# Patient Record
Sex: MALE | Race: WHITE | Employment: OTHER | URBAN - METROPOLITAN AREA
[De-identification: names, ages, dates, MRNs, and addresses within clinical notes are randomized per-mention and may not be internally consistent; named-entity substitution may affect disease eponyms.]

---

## 2020-01-18 ENCOUNTER — HOSPITAL ENCOUNTER (EMERGENCY)
Age: 40
Discharge: HOME | End: 2020-01-18
Attending: EMERGENCY MEDICINE
Payer: COMMERCIAL

## 2020-01-18 VITALS
DIASTOLIC BLOOD PRESSURE: 83 MMHG | RESPIRATION RATE: 18 BRPM | SYSTOLIC BLOOD PRESSURE: 153 MMHG | TEMPERATURE: 97.8 F | HEART RATE: 70 BPM | OXYGEN SATURATION: 100 %

## 2020-01-18 PROCEDURE — 6370000000 HC RX 637 (ALT 250 FOR IP): Performed by: EMERGENCY MEDICINE

## 2020-01-18 PROCEDURE — 99282 EMERGENCY DEPT VISIT SF MDM: CPT

## 2020-01-18 RX ORDER — PENICILLIN V POTASSIUM 500 MG/1
500 TABLET ORAL ONCE
Status: COMPLETED | OUTPATIENT
Start: 2020-01-18 | End: 2020-01-18

## 2020-01-18 RX ORDER — PENICILLIN V POTASSIUM 500 MG/1
500 TABLET ORAL 4 TIMES DAILY
Qty: 40 TABLET | Refills: 0 | Status: SHIPPED | OUTPATIENT
Start: 2020-01-18 | End: 2020-01-28

## 2020-01-18 RX ADMIN — PENICILLIN V POTASIUM 500 MG: 500 TABLET OROPHARYNGEAL at 08:44

## 2020-01-18 ASSESSMENT — PAIN SCALES - GENERAL
PAINLEVEL_OUTOF10: 7
PAINLEVEL_OUTOF10: 7

## 2020-01-18 ASSESSMENT — ENCOUNTER SYMPTOMS
NAUSEA: 0
COUGH: 0
EYE DISCHARGE: 0
BACK PAIN: 0
SORE THROAT: 0
SHORTNESS OF BREATH: 0
EYE PAIN: 0
RHINORRHEA: 0
ABDOMINAL PAIN: 0
VOMITING: 0

## 2020-01-18 ASSESSMENT — PAIN DESCRIPTION - ORIENTATION: ORIENTATION: LEFT;LOWER

## 2020-01-18 ASSESSMENT — PAIN DESCRIPTION - LOCATION
LOCATION: TEETH
LOCATION: TEETH

## 2020-01-18 ASSESSMENT — PAIN DESCRIPTION - PAIN TYPE
TYPE: ACUTE PAIN
TYPE: ACUTE PAIN

## 2020-01-18 NOTE — ED PROVIDER NOTES
[address] ENCOUNTER      Pt Name: Morales Aceves  MRN: [id number]  [place] 1980  Date of evaluation: 1/18/2020  Provider: Sudheer Woodall MD    CHIEF COMPLAINT       Chief Complaint   Patient presents with    Dental Pain     left lower dental pain         HISTORY OF PRESENT ILLNESS      Morales Aceves is a 35 y.o. male who presents to the emergency department  for   Chief Complaint   Patient presents with    Dental Pain     left lower dental pain       35 yom presents with left lower tooth pain. He is concerned about tooth infection. He has a history of poor dentition. He reports that he fracture a left lower molar recently. He tried going to a dentist today but they did not have openings. He was told to he be seen by the dentist in 2 days after the weekend is over. Denies any bleeding or drainage from amount. He is controlling his pain at home with ibuprofen. Denies any trouble swallowing. No trouble chewing. Denies any chest pain abdominal pain. No significant facial swelling. No constitutional infectious symptoms. No nausea or vomiting. Nursing Notes, Triage Notes & Vital Signs were reviewed. REVIEW OF SYSTEMS    (2-9 systems for level 4, 10 or more for level 5)     Review of Systems   Constitutional: Negative for chills and fever. HENT: Positive for dental problem. Negative for congestion, rhinorrhea and sore throat. Eyes: Negative for pain and discharge. Respiratory: Negative for cough and shortness of breath. Cardiovascular: Negative for chest pain and palpitations. Gastrointestinal: Negative for abdominal pain, nausea and vomiting. Endocrine: Negative for polydipsia and polyuria. Genitourinary: Negative for dysuria and flank pain. Musculoskeletal: Negative for back pain and neck pain. Skin: Negative for pallor and wound.    Neurological: Negative for dizziness, facial asymmetry, light-headedness, numbness and headaches. Psychiatric/Behavioral: Negative for confusion. Except as noted above the remainder of the review of systems was reviewed and negative. PAST MEDICAL HISTORY     Past Medical History:   Diagnosis Date    MRSA (methicillin resistant staph aureus) culture positive        Prior to Admission medications    Medication Sig Start Date End Date Taking? Authorizing Provider   penicillin v potassium (VEETID) 500 MG tablet Take 1 tablet by mouth 4 times daily for 10 days 1/18/20 1/28/20 Yes Michael Jo MD   HYDROcodone-acetaminophen [facility]) 5-325 MG per tablet Take 1 tablet by mouth every 4 hours as needed for Pain 10/5/15   Dedrick Fry MD        There is no problem list on file for this patient. SURGICAL HISTORY     History reviewed. No pertinent surgical history. CURRENT MEDICATIONS       Previous Medications    HYDROCODONE-ACETAMINOPHEN (NORCO) 5-325 MG PER TABLET    Take 1 tablet by mouth every 4 hours as needed for Pain       ALLERGIES     Codeine    FAMILY HISTORY     History reviewed. No pertinent family history.        SOCIAL HISTORY       Social History     Socioeconomic History    Marital status:      Spouse name: None    Number of children: None    Years of education: None    Highest education level: None   Occupational History    None   Social Needs    Financial resource strain: None    Food insecurity:     Worry: None     Inability: None    Transportation needs:     Medical: None     Non-medical: None   Tobacco Use    Smoking status: Current Every Day Smoker     Packs/day: 1.00    Smokeless tobacco: Never Used   Substance and Sexual Activity    Alcohol use: No    Drug use: No    Sexual activity: None   Lifestyle    Physical activity:     Days per week: None     Minutes per session: None    Stress: None   Relationships    Social connections:     Talks on phone: None     Gets together: None     Attends Nondenominational service: None     Active member of club or organization: None     Attends meetings of clubs or organizations: None     Relationship status: None    Intimate partner violence:     Fear of current or ex partner: None     Emotionally abused: None     Physically abused: None     Forced sexual activity: None   Other Topics Concern    None   Social History Narrative    None       SCREENINGS    Dalia Coma Scale  Eye Opening: Spontaneous  Best Verbal Response: Oriented  Best Motor Response: Obeys commands  Dalia Coma Scale Score: 15          PHYSICAL EXAM    (up to 7 for level 4, 8 or more for level 5)     ED Triage Vitals [01/18/20 0801]   BP Temp Temp Source Pulse Resp SpO2 Height Weight   (!) 153/83 97.8 °F (36.6 °C) Oral 70 18 100 % -- --       Physical Exam  Vitals signs reviewed. Constitutional:       Appearance: He is not ill-appearing or toxic-appearing. HENT:      Head: Normocephalic and atraumatic. Nose: No congestion or rhinorrhea. Mouth/Throat:      Mouth: Mucous membranes are moist.      Pharynx: No oropharyngeal exudate or posterior oropharyngeal erythema. Comments: Poor dentition, fractured tooth, left lower molar. Some mild gingival swelling. No fluctuance or drainable abscess noted  No floor of mouth induration, tongue elevation, uvula midline  Eyes:      General:         Right eye: No discharge. Left eye: No discharge. Extraocular Movements: Extraocular movements intact. Pupils: Pupils are equal, round, and reactive to light. Neck:      Musculoskeletal: Normal range of motion. No neck rigidity. Cardiovascular:      Rate and Rhythm: Normal rate. Heart sounds: No friction rub. No gallop. Pulmonary:      Effort: Pulmonary effort is normal.      Breath sounds: No rales. Chest:      Chest wall: No tenderness. Abdominal:      Palpations: Abdomen is soft. Tenderness: There is no tenderness. There is no guarding.       Comments: Abdomen soft, non-tender, non-peritoneal  No guarding on abdominal exam   Musculoskeletal: Normal range of motion. General: No tenderness. Right lower leg: No edema. Left lower leg: No edema. Lymphadenopathy:      Cervical: No cervical adenopathy. Skin:     General: Skin is warm. Capillary Refill: Capillary refill takes less than 2 seconds. Findings: No erythema, lesion or rash. Neurological:      General: No focal deficit present. Mental Status: He is alert and oriented to person, place, and time. DIAGNOSTIC RESULTS     Labs Reviewed - No data to display         RADIOLOGY:     Non-plain film images such as CT, Ultrasound and MRI are read by the radiologist. Plain radiographic images are visualized and preliminarily interpreted by the emergency physician. Interpretation per the Radiologist below, if available at the time of this note:    No orders to display         ED BEDSIDE ULTRASOUND:   Performed by ED Physician Dane Clark MD       LABS:  Labs Reviewed - No data to display    All other labs were within normal range or not returned as of this dictation. EMERGENCY DEPARTMENT COURSE and DIFFERENTIAL DIAGNOSIS/MDM:   Vitals:    Vitals:    01/18/20 0801   BP: (!) 153/83   Pulse: 70   Resp: 18   Temp: 97.8 °F (36.6 °C)   TempSrc: Oral   SpO2: 100%           MDM  Number of Diagnoses or Management Options  Dental infection:   Pain, dental:   Diagnosis management comments: 35 yom presents with left lower dental pain. He has previously fractured a left lower molar. He endorses pain at the site. He went to a dentist today but is unable to get an appointment till 2 days from now. He denies any fever or chills. No trouble swallowing or chewing. On exam, he does have some gingival swelling in the left lower mouth. No fluctuance or drainable abscess noted. He will be given a dose of penicillin VK in the emergency department.   He will prescribed antibiotics for home.  He has been controlling his pain at home with Advil. He will follow-up with his dentist in 2 days. He is also given a list of dental resources. Return precautions for worsening concerning symptoms discussed. He discharged home in stable condition. CONSULTS:  None    PROCEDURES:  None performed unless otherwise noted below     Procedures        FINAL IMPRESSION      1. Pain, dental    2. Dental infection          DISPOSITION/PLAN   DISPOSITION Discharge - Pending Orders Complete 01/18/2020 08:44:51 AM      PATIENT REFERRED TO:  No follow-up provider specified. DISCHARGE MEDICATIONS:  New Prescriptions    PENICILLIN V POTASSIUM (VEETID) 500 MG TABLET    Take 1 tablet by mouth 4 times daily for 10 days       ED Provider Disposition Time  DISPOSITION Discharge - Pending Orders Complete 01/18/2020 08:44:51 AM      The Patient was instructed to read the package inserts with any medication that was prescribed. Major potential reactions and medication interactions were discussed. The Patient understands that there are numerous possible adverse reactions not covered. The patient was also instructed to arrange follow-up with his or her primary care provider for review of any pending labwork or incidental findings on any radiology results that were obtained. All efforts were made to discuss any incidental findings that require further monitoring. Controlled Substances Monitoring:     No flowsheet data found.     (Please note that portions of this note were completed with a voice recognition program.  Efforts were made to edit the dictations but occasionally words are mis-transcribed.)    Arbutus Essex, MD (electronically signed)  Attending Emergency Physician          Arbutus Essex, MD  01/18/20 1266

## 2022-09-25 ENCOUNTER — APPOINTMENT (OUTPATIENT)
Dept: CT IMAGING | Age: 42
DRG: 465 | End: 2022-09-25
Payer: COMMERCIAL

## 2022-09-25 ENCOUNTER — HOSPITAL ENCOUNTER (INPATIENT)
Age: 42
LOS: 1 days | Discharge: LEFT BEFORE DISCHARGE | DRG: 465 | End: 2022-09-25
Attending: EMERGENCY MEDICINE | Admitting: STUDENT IN AN ORGANIZED HEALTH CARE EDUCATION/TRAINING PROGRAM
Payer: COMMERCIAL

## 2022-09-25 VITALS
TEMPERATURE: 97.9 F | BODY MASS INDEX: 31.08 KG/M2 | HEIGHT: 75 IN | OXYGEN SATURATION: 97 % | RESPIRATION RATE: 16 BRPM | SYSTOLIC BLOOD PRESSURE: 180 MMHG | HEART RATE: 60 BPM | DIASTOLIC BLOOD PRESSURE: 102 MMHG | WEIGHT: 250 LBS

## 2022-09-25 DIAGNOSIS — D72.829 LEUKOCYTOSIS, UNSPECIFIED TYPE: ICD-10-CM

## 2022-09-25 DIAGNOSIS — N20.0 KIDNEY STONE: Primary | ICD-10-CM

## 2022-09-25 DIAGNOSIS — R73.9 HYPERGLYCEMIA: ICD-10-CM

## 2022-09-25 DIAGNOSIS — N10 ACUTE PYELONEPHRITIS: ICD-10-CM

## 2022-09-25 LAB
ALBUMIN SERPL-MCNC: 4.1 GM/DL (ref 3.4–5)
ALP BLD-CCNC: 62 IU/L (ref 40–129)
ALT SERPL-CCNC: 17 U/L (ref 10–40)
ANION GAP SERPL CALCULATED.3IONS-SCNC: 13 MMOL/L (ref 4–16)
AST SERPL-CCNC: 16 IU/L (ref 15–37)
BACTERIA: NORMAL /HPF
BASOPHILS ABSOLUTE: 0.1 K/CU MM
BASOPHILS RELATIVE PERCENT: 0.4 % (ref 0–1)
BILIRUB SERPL-MCNC: 0.2 MG/DL (ref 0–1)
BILIRUBIN URINE: NEGATIVE
BLOOD, URINE: NORMAL
BUN BLDV-MCNC: 16 MG/DL (ref 6–23)
CALCIUM SERPL-MCNC: 9.2 MG/DL (ref 8.3–10.6)
CHLORIDE BLD-SCNC: 104 MMOL/L (ref 99–110)
CLARITY: NORMAL
CO2: 19 MMOL/L (ref 21–32)
COLOR: YELLOW
CREAT SERPL-MCNC: 0.9 MG/DL (ref 0.9–1.3)
DIFFERENTIAL TYPE: ABNORMAL
EOSINOPHILS ABSOLUTE: 0.2 K/CU MM
EOSINOPHILS RELATIVE PERCENT: 0.9 % (ref 0–3)
GFR AFRICAN AMERICAN: >60 ML/MIN/1.73M2
GFR NON-AFRICAN AMERICAN: >60 ML/MIN/1.73M2
GLUCOSE BLD-MCNC: 183 MG/DL (ref 70–99)
GLUCOSE, URINE: NEGATIVE MG/DL
HCT VFR BLD CALC: 43.8 % (ref 42–52)
HEMOGLOBIN: 14.4 GM/DL (ref 13.5–18)
IMMATURE NEUTROPHIL %: 0.5 % (ref 0–0.43)
KETONES, URINE: NORMAL MG/DL
LACTATE: 1.6 MMOL/L (ref 0.4–2)
LEUKOCYTE ESTERASE, URINE: NORMAL
LYMPHOCYTES ABSOLUTE: 1.9 K/CU MM
LYMPHOCYTES RELATIVE PERCENT: 9.4 % (ref 24–44)
MCH RBC QN AUTO: 30.8 PG (ref 27–31)
MCHC RBC AUTO-ENTMCNC: 32.9 % (ref 32–36)
MCV RBC AUTO: 93.8 FL (ref 78–100)
MONOCYTES ABSOLUTE: 0.8 K/CU MM
MONOCYTES RELATIVE PERCENT: 4.2 % (ref 0–4)
MUCUS: NORMAL HPF
NITRITE URINE, QUANTITATIVE: NEGATIVE
NUCLEATED RBC %: 0 %
PDW BLD-RTO: 13.2 % (ref 11.7–14.9)
PH, URINE: 5.5
PLATELET # BLD: 254 K/CU MM (ref 140–440)
PMV BLD AUTO: 12.1 FL (ref 7.5–11.1)
POTASSIUM SERPL-SCNC: 4.4 MMOL/L (ref 3.5–5.1)
PROTEIN UA: 30 MG/DL
RBC # BLD: 4.67 M/CU MM (ref 4.6–6.2)
RBC URINE: 37 /HPF
SEGMENTED NEUTROPHILS ABSOLUTE COUNT: 16.7 K/CU MM
SEGMENTED NEUTROPHILS RELATIVE PERCENT: 84.6 % (ref 36–66)
SODIUM BLD-SCNC: 136 MMOL/L (ref 135–145)
SPECIFIC GRAVITY UA: >1.03
TOTAL IMMATURE NEUTOROPHIL: 0.1 K/CU MM
TOTAL NUCLEATED RBC: 0 K/CU MM
TOTAL PROTEIN: 7.7 GM/DL (ref 6.4–8.2)
TRICHOMONAS: NORMAL /HPF
UROBILINOGEN, URINE: 0.2 MG/DL
WBC # BLD: 19.7 K/CU MM (ref 4–10.5)
WBC UA: 17 /HPF

## 2022-09-25 PROCEDURE — 87150 DNA/RNA AMPLIFIED PROBE: CPT

## 2022-09-25 PROCEDURE — 85025 COMPLETE CBC W/AUTO DIFF WBC: CPT

## 2022-09-25 PROCEDURE — 96365 THER/PROPH/DIAG IV INF INIT: CPT

## 2022-09-25 PROCEDURE — 87040 BLOOD CULTURE FOR BACTERIA: CPT

## 2022-09-25 PROCEDURE — 81001 URINALYSIS AUTO W/SCOPE: CPT

## 2022-09-25 PROCEDURE — 2580000003 HC RX 258: Performed by: EMERGENCY MEDICINE

## 2022-09-25 PROCEDURE — 80053 COMPREHEN METABOLIC PANEL: CPT

## 2022-09-25 PROCEDURE — 6360000002 HC RX W HCPCS: Performed by: EMERGENCY MEDICINE

## 2022-09-25 PROCEDURE — 96375 TX/PRO/DX INJ NEW DRUG ADDON: CPT

## 2022-09-25 PROCEDURE — 83605 ASSAY OF LACTIC ACID: CPT

## 2022-09-25 PROCEDURE — 99285 EMERGENCY DEPT VISIT HI MDM: CPT

## 2022-09-25 PROCEDURE — 2580000003 HC RX 258: Performed by: NURSE PRACTITIONER

## 2022-09-25 PROCEDURE — 96376 TX/PRO/DX INJ SAME DRUG ADON: CPT

## 2022-09-25 PROCEDURE — 6360000002 HC RX W HCPCS: Performed by: NURSE PRACTITIONER

## 2022-09-25 PROCEDURE — 74176 CT ABD & PELVIS W/O CONTRAST: CPT

## 2022-09-25 PROCEDURE — 87086 URINE CULTURE/COLONY COUNT: CPT

## 2022-09-25 PROCEDURE — 1200000000 HC SEMI PRIVATE

## 2022-09-25 RX ORDER — 0.9 % SODIUM CHLORIDE 0.9 %
1000 INTRAVENOUS SOLUTION INTRAVENOUS ONCE
Status: COMPLETED | OUTPATIENT
Start: 2022-09-25 | End: 2022-09-25

## 2022-09-25 RX ORDER — KETOROLAC TROMETHAMINE 10 MG/1
10 TABLET, FILM COATED ORAL EVERY 6 HOURS PRN
Qty: 20 TABLET | Refills: 0 | Status: SHIPPED | OUTPATIENT
Start: 2022-09-25 | End: 2022-09-25 | Stop reason: CLARIF

## 2022-09-25 RX ORDER — KETOROLAC TROMETHAMINE 30 MG/ML
15 INJECTION, SOLUTION INTRAMUSCULAR; INTRAVENOUS EVERY 6 HOURS
Status: DISCONTINUED | OUTPATIENT
Start: 2022-09-25 | End: 2022-09-25 | Stop reason: HOSPADM

## 2022-09-25 RX ORDER — ONDANSETRON 4 MG/1
4 TABLET, ORALLY DISINTEGRATING ORAL ONCE
Status: DISCONTINUED | OUTPATIENT
Start: 2022-09-25 | End: 2022-09-25 | Stop reason: HOSPADM

## 2022-09-25 RX ORDER — FENTANYL CITRATE 50 UG/ML
50 INJECTION, SOLUTION INTRAMUSCULAR; INTRAVENOUS ONCE
Status: COMPLETED | OUTPATIENT
Start: 2022-09-25 | End: 2022-09-25

## 2022-09-25 RX ORDER — HYDROCODONE BITARTRATE AND ACETAMINOPHEN 5; 325 MG/1; MG/1
1-2 TABLET ORAL EVERY 4 HOURS PRN
Qty: 15 TABLET | Refills: 0 | Status: SHIPPED | OUTPATIENT
Start: 2022-09-25 | End: 2022-09-25 | Stop reason: CLARIF

## 2022-09-25 RX ORDER — NICOTINE 21 MG/24HR
1 PATCH, TRANSDERMAL 24 HOURS TRANSDERMAL DAILY
Status: DISCONTINUED | OUTPATIENT
Start: 2022-09-25 | End: 2022-09-25 | Stop reason: HOSPADM

## 2022-09-25 RX ORDER — SODIUM CHLORIDE 9 MG/ML
INJECTION, SOLUTION INTRAVENOUS CONTINUOUS
Status: DISCONTINUED | OUTPATIENT
Start: 2022-09-25 | End: 2022-09-25

## 2022-09-25 RX ORDER — TAMSULOSIN HYDROCHLORIDE 0.4 MG/1
0.4 CAPSULE ORAL DAILY
Status: DISCONTINUED | OUTPATIENT
Start: 2022-09-25 | End: 2022-09-25 | Stop reason: HOSPADM

## 2022-09-25 RX ORDER — ONDANSETRON 2 MG/ML
4 INJECTION INTRAMUSCULAR; INTRAVENOUS EVERY 6 HOURS PRN
Status: DISCONTINUED | OUTPATIENT
Start: 2022-09-25 | End: 2022-09-25 | Stop reason: HOSPADM

## 2022-09-25 RX ORDER — SODIUM CHLORIDE 9 MG/ML
INJECTION, SOLUTION INTRAVENOUS CONTINUOUS
Status: DISCONTINUED | OUTPATIENT
Start: 2022-09-25 | End: 2022-09-25 | Stop reason: HOSPADM

## 2022-09-25 RX ORDER — POLYETHYLENE GLYCOL 3350 17 G/17G
17 POWDER, FOR SOLUTION ORAL DAILY PRN
Status: DISCONTINUED | OUTPATIENT
Start: 2022-09-25 | End: 2022-09-25 | Stop reason: HOSPADM

## 2022-09-25 RX ORDER — ONDANSETRON 2 MG/ML
4 INJECTION INTRAMUSCULAR; INTRAVENOUS EVERY 30 MIN PRN
Status: DISCONTINUED | OUTPATIENT
Start: 2022-09-25 | End: 2022-09-25 | Stop reason: HOSPADM

## 2022-09-25 RX ORDER — ONDANSETRON 4 MG/1
4 TABLET, FILM COATED ORAL 3 TIMES DAILY PRN
Qty: 15 TABLET | Refills: 0 | Status: SHIPPED | OUTPATIENT
Start: 2022-09-25 | End: 2022-09-25 | Stop reason: CLARIF

## 2022-09-25 RX ORDER — SODIUM CHLORIDE 0.9 % (FLUSH) 0.9 %
5-40 SYRINGE (ML) INJECTION PRN
Status: DISCONTINUED | OUTPATIENT
Start: 2022-09-25 | End: 2022-09-25 | Stop reason: HOSPADM

## 2022-09-25 RX ORDER — MORPHINE SULFATE 4 MG/ML
4 INJECTION, SOLUTION INTRAMUSCULAR; INTRAVENOUS ONCE
Status: COMPLETED | OUTPATIENT
Start: 2022-09-25 | End: 2022-09-25

## 2022-09-25 RX ORDER — SODIUM CHLORIDE 0.9 % (FLUSH) 0.9 %
5-40 SYRINGE (ML) INJECTION EVERY 12 HOURS SCHEDULED
Status: DISCONTINUED | OUTPATIENT
Start: 2022-09-25 | End: 2022-09-25 | Stop reason: HOSPADM

## 2022-09-25 RX ORDER — ACETAMINOPHEN 650 MG/1
650 SUPPOSITORY RECTAL EVERY 6 HOURS PRN
Status: DISCONTINUED | OUTPATIENT
Start: 2022-09-25 | End: 2022-09-25 | Stop reason: HOSPADM

## 2022-09-25 RX ORDER — ONDANSETRON 4 MG/1
4 TABLET, ORALLY DISINTEGRATING ORAL EVERY 8 HOURS PRN
Status: DISCONTINUED | OUTPATIENT
Start: 2022-09-25 | End: 2022-09-25 | Stop reason: HOSPADM

## 2022-09-25 RX ORDER — KETOROLAC TROMETHAMINE 30 MG/ML
15 INJECTION, SOLUTION INTRAMUSCULAR; INTRAVENOUS ONCE
Status: COMPLETED | OUTPATIENT
Start: 2022-09-25 | End: 2022-09-25

## 2022-09-25 RX ORDER — ACETAMINOPHEN 325 MG/1
650 TABLET ORAL EVERY 6 HOURS PRN
Status: DISCONTINUED | OUTPATIENT
Start: 2022-09-25 | End: 2022-09-25 | Stop reason: HOSPADM

## 2022-09-25 RX ORDER — SODIUM CHLORIDE 9 MG/ML
25 INJECTION, SOLUTION INTRAVENOUS PRN
Status: DISCONTINUED | OUTPATIENT
Start: 2022-09-25 | End: 2022-09-25 | Stop reason: HOSPADM

## 2022-09-25 RX ADMIN — SODIUM CHLORIDE: 9 INJECTION, SOLUTION INTRAVENOUS at 12:03

## 2022-09-25 RX ADMIN — ONDANSETRON 4 MG: 2 INJECTION INTRAMUSCULAR; INTRAVENOUS at 08:34

## 2022-09-25 RX ADMIN — MORPHINE SULFATE 4 MG: 4 INJECTION, SOLUTION INTRAMUSCULAR; INTRAVENOUS at 09:57

## 2022-09-25 RX ADMIN — ONDANSETRON 4 MG: 2 INJECTION INTRAMUSCULAR; INTRAVENOUS at 12:19

## 2022-09-25 RX ADMIN — MORPHINE SULFATE 4 MG: 4 INJECTION, SOLUTION INTRAMUSCULAR; INTRAVENOUS at 12:19

## 2022-09-25 RX ADMIN — FENTANYL CITRATE 50 MCG: 50 INJECTION, SOLUTION INTRAMUSCULAR; INTRAVENOUS at 08:35

## 2022-09-25 RX ADMIN — CEFTRIAXONE SODIUM 1000 MG: 1 INJECTION, POWDER, FOR SOLUTION INTRAMUSCULAR; INTRAVENOUS at 12:04

## 2022-09-25 RX ADMIN — SODIUM CHLORIDE 1000 ML: 9 INJECTION, SOLUTION INTRAVENOUS at 08:38

## 2022-09-25 RX ADMIN — KETOROLAC TROMETHAMINE 15 MG: 30 INJECTION, SOLUTION INTRAMUSCULAR; INTRAVENOUS at 07:01

## 2022-09-25 ASSESSMENT — ENCOUNTER SYMPTOMS
SHORTNESS OF BREATH: 0
ABDOMINAL PAIN: 1
VOMITING: 1
NAUSEA: 1
COUGH: 0

## 2022-09-25 ASSESSMENT — PAIN DESCRIPTION - LOCATION
LOCATION: ABDOMEN;FLANK
LOCATION: ABDOMEN;FLANK
LOCATION: ABDOMEN;BACK
LOCATION: ABDOMEN
LOCATION: FLANK
LOCATION: ABDOMEN

## 2022-09-25 ASSESSMENT — PAIN DESCRIPTION - ORIENTATION
ORIENTATION: LEFT
ORIENTATION: LEFT;LOWER
ORIENTATION: LEFT
ORIENTATION: LEFT;LOWER
ORIENTATION: LOWER;LEFT

## 2022-09-25 ASSESSMENT — PAIN DESCRIPTION - DESCRIPTORS
DESCRIPTORS: SHARP;STABBING
DESCRIPTORS: STABBING
DESCRIPTORS: JABBING;HEAVINESS

## 2022-09-25 ASSESSMENT — PAIN - FUNCTIONAL ASSESSMENT
PAIN_FUNCTIONAL_ASSESSMENT: 0-10

## 2022-09-25 ASSESSMENT — PAIN SCALES - GENERAL
PAINLEVEL_OUTOF10: 10
PAINLEVEL_OUTOF10: 7
PAINLEVEL_OUTOF10: 7
PAINLEVEL_OUTOF10: 10
PAINLEVEL_OUTOF10: 10
PAINLEVEL_OUTOF10: 7

## 2022-09-25 NOTE — ED PROVIDER NOTES
I independently examined and evaluated Ruben Gambino. In brief, 69-year-old male who presents with left lower quadrant abdominal pain, abrupt onset last evening. Describes associated vomiting and cold chills. No history of similar symptoms or prior abdominal surgeries. Focused exam revealed the patient appears well-hydrated, well-nourished. Appears uncomfortable. Mucous membranes are moist. Speech is clear. Breathing is unlabored. Skin is dry. Mental status is normal. The patient moves all extremities and is without facial droop. ED course: Patient is quite uncomfortable on arrival.  His pain has been somewhat difficult to control. He is given Toradol as well as additional opiates. Nausea controlled with Zofran. He has been given IV fluids. Labs are reviewed and are notable for leukocytosis of 19. Renal function is unremarkable the glucose is elevated at 183. Urinalysis is concerning for high specific gravity, small leuks and occasional bacteria. Imaging obtained as patient's presentation is highly suspicious for obstructive uropathy. There is a possible phlebolith versus obstructing stone per radiologist.    I am concerned for pyelonephritis versus infected ureteral stone. Patient has been given Rocephin. Both blood and urine cultures pending. Care discussed with urology. Agree with plan for admission. Final Impression:  1. Kidney stone    2. Acute pyelonephritis    3. Hyperglycemia    4. Leukocytosis, unspecified type      DISPOSITION Decision To Admit 09/25/2022 11:33:02 AM      All diagnostic, treatment, and disposition decisions were made by myself in conjunction with the advanced practice provider. For all further details of the patient's emergency department visit, please see the advanced practice provider's documentation.     Comment: Please note this report has been produced using speech recognition software and may contain errors related to that system including errors in grammar, punctuation, and spelling, as well as words and phrases that may be inappropriate. If there are any questions or concerns please feel free to contact the dictating provider for clarification.         [address] [place] [address],   09/25/22 0458

## 2022-09-25 NOTE — ED PROVIDER NOTES
[address] ENCOUNTER        Pt Name: Kamala Carbajal  MRN: [id number]  [place] 1980  Date of evaluation: 9/25/2022  Provider: NIKOLAS Meade CNP  PCP: No primary care provider on file. I have seen and evaluated this patient with my supervising physician Dr. Ady So       Chief Complaint   Patient presents with    Abdominal Pain     Left lower    Flank Pain         HISTORY OF PRESENT ILLNESS      Chief Complaint: Left lower quadrant abdominal pain    Francis Gilbert is a 36 y.o. male who presents for evaluation of left lower quadrant abdominal pain that started suddenly approximately midnight tonight. Patient reports the pain is severe and has been associated with several episodes of vomiting. Patient continues to be nauseous but has not vomited for the last couple of hours. She denies any diarrhea, fever, chills, or urinary symptoms. Patient has no prior history of similar symptoms. No prior abdominal surgeries. Nursing Notes were all reviewed and agreed with or any disagreements were addressed in the HPI. REVIEW OF SYSTEMS     Constitutional:   Denies fever, chills, weight loss or weakness   Cardiovascular:   Denies chest pain, palpitations   Respiratory:  Denies cough or shortness of breath    GI: Endorses abdominal pain, nausea, vomiting. Denies  diarrhea  :  Denies any urinary symptoms   Musculoskeletal:   Denies back pain  Skin:   Denies rash  Endocrine:  Denies polyuria or polydypsia   Lymphatic:  Denies swollen glands     PAST MEDICAL HISTORY     Past Medical History:   Diagnosis Date    MRSA (methicillin resistant staph aureus) culture positive        SURGICAL HISTORY   History reviewed. No pertinent surgical history. CURRENTMEDICATIONS       There are no discharge medications for this patient.       ALLERGIES     Codeine    FAMILYHISTORY     History reviewed. No pertinent family history. SOCIAL HISTORY       Social History     Socioeconomic History    Marital status:      Spouse name: None    Number of children: None    Years of education: None    Highest education level: None   Tobacco Use    Smoking status: Every Day     Packs/day: 1.00     Types: Cigarettes    Smokeless tobacco: Never   Vaping Use    Vaping Use: Never used   Substance and Sexual Activity    Alcohol use: No    Drug use: Yes     Types: Marijuana [place])     Comment: daily    Sexual activity: Yes     Partners: Female       SCREENINGS    Dalia Coma Scale  Eye Opening: Spontaneous  Best Verbal Response: Oriented  Best Motor Response: Obeys commands  [place] Coma Scale Score: 15      PHYSICAL EXAM       ED Triage Vitals   BP Temp Temp Source Heart Rate Resp SpO2 Height Weight   09/25/22 0606 09/25/22 0606 09/25/22 0606 09/25/22 0606 09/25/22 0606 09/25/22 0606 09/25/22 0602 09/25/22 0602   (!) 176/98 97.9 °F (36.6 °C) Oral 51 19 98 % 6' 3\" (1.905 m) 250 lb (113.4 kg)      Constitutional:  Well developed, Well nourished. No distress  HENT:  Normocephalic, Atraumatic. Cardiovascular:   RRR,  no murmurs/rubs/gallops. Respiratory:   Nonlabored breathing. Normal breath sounds, No wheezing  Abdomen: Bowel sounds normal, Soft, no masses, moderate tenderness over the left lower quadrant. No CVA tenderness bilaterally. Musculoskeletal:  No tenderness over the lower back  Integument:   Warm, Dry, no rash  Neurologic:  Alert & oriented , Sensation intact.   Psychiatric:  Affect normal, Mood normal.     DIAGNOSTIC RESULTS   LABS:    Labs Reviewed   COMPREHENSIVE METABOLIC PANEL - Abnormal; Notable for the following components:       Result Value    CO2 19 (*)     Glucose 183 (*)     All other components within normal limits   CBC WITH AUTO DIFFERENTIAL - Abnormal; Notable for the following components:    WBC 19.7 (*)     MPV 12.1 (*)     Segs Relative 84.6 (*)     Lymphocytes % 9.4 (*) Monocytes % 4.2 (*)     Immature Neutrophil % 0.5 (*)     All other components within normal limits   CULTURE, URINE   CULTURE, BLOOD 1   CULTURE, BLOOD 2   URINALYSIS   MICROSCOPIC URINALYSIS   LACTIC ACID       When ordered, only abnormal lab results are displayed. All other labs were within normal range or not returned as of this dictation. EKG: When ordered, EKG's are interpreted by the Emergency Department Physician in the absence of a cardiologist.  Please see their note for interpretation of EKG. RADIOLOGY:   Non-plain film images such as CT, Ultrasound and MRI are read by the radiologist. Plain radiographic images are visualized and preliminarily interpreted by the  ED Provider with the below findings:    Interpretation perthe Radiologist below, if available at the time of this note:    CT ABDOMEN PELVIS WO CONTRAST Additional Contrast? None   Final Result   Small 3 mm calcification favored to represent a small phlebolith abutting the   left ureter rather than a proximal ureteral calculus. Small calculus within the midpole of left kidney which is nonobstructive. No results found.       PROCEDURES   Unless otherwise noted below, none         CRITICAL CARE   CRITICAL CARE NOTE:  N/A    CONSULTS:  IP CONSULT TO HOSPITALIST  IP CONSULT TO UROLOGY      EMERGENCY DEPARTMENT COURSE and MDM:   Vitals:    Vitals:    09/25/22 0602 09/25/22 0606 09/25/22 0957 09/25/22 1136   BP:  (!) 176/98  (!) 153/74   Pulse:  51  70   Resp:  19 15 18   Temp:  97.9 °F (36.6 °C)     TempSrc:  Oral     SpO2:  98%  96%   Weight: 250 lb (113.4 kg)      Height: 6' 3\" (1.905 m)          Patient was given thefollowing medications:  Medications   ondansetron (ZOFRAN-ODT) disintegrating tablet 4 mg ( Oral Canceled Entry 9/25/22 0702)   ondansetron (ZOFRAN) injection 4 mg (4 mg IntraVENous Given 9/25/22 1219)   cefTRIAXone (ROCEPHIN) 1,000 mg in dextrose 5 % 50 mL IVPB mini-bag (1,000 mg IntraVENous New Bag 9/25/22 1204) 0.9 % sodium chloride infusion ( IntraVENous New Bag 9/25/22 1203)   ketorolac (TORADOL) injection 15 mg (15 mg IntraVENous Given 9/25/22 0701)   0.9 % sodium chloride bolus (0 mLs IntraVENous Stopped 9/25/22 1012)   fentaNYL (SUBLIMAZE) injection 50 mcg (50 mcg IntraVENous Given 9/25/22 0835)   morphine sulfate (PF) injection 4 mg (4 mg IntraVENous Given 9/25/22 0957)   morphine sulfate (PF) injection 4 mg (4 mg IntraVENous Given 9/25/22 1219)     Is this patient to be included in the SEP-1 Core Measure due to severe sepsis or septic shock? No, infection not suspected      MDM:  Patient presents as above. Emergent etiologies considered. Patient seen and examined. Work-up initiated secondary to presentation, physical exam findings, vital signs and medical chart review. In brief, patient is a 48-year-old male with no significant medical history who presents for evaluation of left lower quadrant abdominal pain that started suddenly approximately 6 hours ago. On exam, patient is quite uncomfortable and is agitated. He has tender over the left lower quadrant and has no CVA tenderness. Differential initially includes primarily diverticulitis versus kidney stone. Patient refused an IV. He was given the option of IM Toradol secondarily and he refused this as well. Thus he was just given Zofran. We will check labs and a urinalysis and do a CT of the abdomen and pelvis to evaluate for abdominal pathology. 7215: On reevaluation, the patient is feeling much better. Pain is well controlled. CT reveals a small 3 mm nonobstructive proximal ureteral calculus vs. Phlebolith. CMP unremarkable except for glucose of 183. Awaiting CBC, urine. 1728: CBC shows WBC of 19.2. Patient has no urinary symptoms or systemic symptoms of illness. Awaiting urinalysis. Patient is having difficulty providing sample. Agreeable to IV fluids at this point. Also complaining of increased pain.   Will administer small amount of fentanyl. 8464:  Re-evaluated patient. He reports uncontrolled pain, no improvement with fentanyl. States he is unable to provide urine sample until pain controlled. Will order morphine 4mg. Updated him on CBC results and need for urine to rule out possibility of urinary infection complicating kidney stone. 1145: Patient's urinalysis does appear concerning for infection. Differential now includes complicated UTI versus pyelonephritis versus infected kidney stone. Given these findings plus his leukocytosis as well as hyperglycemia, recommend admission for further evaluation. Blood cultures, lactate, and Rocephin ordered. Consultation placed to hospitalist.  Patient is agreeable to admission. 1155: Spoke with Jimenez Fraser NP with urology and updated on ED presentation, imaging, and lab results. Will follow patient. Request n.p.o. after midnight. 1222:  Spoke with Shon Larson NP with hospitalist service and updated on ED presentation, imaging, and lab results. Will accept the patient. CLINICAL IMPRESSION      1. Kidney stone    2. Acute pyelonephritis    3. Hyperglycemia    4. Leukocytosis, unspecified type          DISPOSITION/PLAN   DISPOSITION Decision To Admit 09/25/2022 11:33:02 AM      PATIENT REFERREDTO:  Jasvir Calvo, [address] [facility]  [phone]    Schedule an appointment as soon as possible for a visit   3-4 days    [facility] Emergency Department  [place] [id number] [phone]    If symptoms worsen    DISCHARGE MEDICATIONS:  There are no discharge medications for this patient. DISCONTINUED MEDICATIONS:  There are no discharge medications for this patient.              (Please note that portions ofthis note were completed with a voice recognition program.  Efforts were made to edit the dictations but occasionally words are mis-transcribed.)    Maura Rodriguez, APRN - CNP (electronically signed)            Olga Birmingham, NIKOLAS - CNP  09/25/22 1624

## 2022-09-25 NOTE — ED NOTES
[facility] called Liliana WINN taking calls for [facility].       Sadiq Arcos  09/25/22 8717

## 2022-09-25 NOTE — ED TRIAGE NOTES
Pt came via ED d/t left lower abdomen pain with some back pain onset midnight. Has c/o N/V, states 3 or 4 emesis. Alert/oriented, RR even/unlabored.  Denies any urinary symptoms

## 2022-09-25 NOTE — ED NOTES
Went in 3 times to tell pt now that Doctors are requesting urine to help further diagnosis him and help with his care. Pt stood up abruptly stating he was never coming back here for care.  Went to the bathroom and stated he attempted to urinate and was unsuccessful and stated we did not understand the pain      Cony Weaver RN  09/25/22 8868

## 2022-09-25 NOTE — Clinical Note
Sunshine Ma was seen and treated in our emergency department on 9/25/2022. He may return to work on 09/27/2022. If you have any questions or concerns, please don't hesitate to call.       Maisie Severe, DO

## 2022-09-25 NOTE — PROGRESS NOTES
Patient arrived via stretcher from ED to room 1130. This nurse asked patient to transfer to hospital bed and patient immediately was agitated. Patient then began asking this nurse to get him a glass of water, while this nurse was trying to release orders to make sure patient was not NPO. Patient became inpatient then began getting aggressive and yelling at this nurse. Wife was sitting at bedside as well asking patient to calm down. This nurse then called a SOS. Charge nurse, supervisor and security officers arrived and diffused the situation.

## 2022-09-25 NOTE — ED NOTES
1150 paged hospitalist     Lou Deleon  09/25/22 88 Melba Parada NP with [facility] group returned call     Lou Deleon  09/25/22 6807

## 2022-09-26 LAB
CULTURE: NORMAL
Lab: NORMAL
SPECIMEN: NORMAL

## 2022-09-26 NOTE — DISCHARGE SUMMARY
Discharge Summary    Name:  Huber Archer /Age/Sex: 1980  (36 y.o. male)   MRN & CSN:  [id number] & [id number] Admission Date/Time: 2022  6:03 AM   Attending:  No att. providers found Discharging Physician: Jesús Franco, [address] Hospital Course:   Huber Archer is a 36 y.o.  male  who presents with Renal calculus, left    Informed that the patient wishes to leave against medical advice,  after patient arrived to his room he became agitated with staff , the nurse called a show of support for arrival of campus security and other staff. Patient stated to staff  \"I do want to stay here \" the risks (including but not limited to suffering and death) as well as the benefits were explained to the patient. Questions were sought and answered, the patient voiced understanding and accepts these risks. He was encouraged  to remain until treatment/evaluation are completed. Attending physician, Dr. Rissa Perez went to bedside when notified patient wanted to leave he discussed the risk with the patient of leaving AMA he verbalized understanding. AMA form to be signed and placed on the chart.      Jesús VAZQUEZ  Hospitalist      Consults this admission:  IP CONSULT TO HOSPITALIST  IP CONSULT TO [address] [place] [address] Way    Discharge Instruction:   Patient left facility AMA    Discharge Medications:        Medication List        STOP taking these medications      HYDROcodone-acetaminophen 5-325 MG per tablet  Commonly known as: Norco              Objective Findings at Discharge:   BP (!) 180/102   Pulse 60   Temp 97.9 °F (36.6 °C) (Oral)   Resp 16   Ht 6' 3\" (1.905 m)   Wt 250 lb (113.4 kg)   SpO2 97%   BMI 31.25 kg/m²                BMP/CBC  Recent Labs     22  0636      K 4.4      CO2 19*   BUN 16   CREATININE 0.9   WBC 19.7*   HCT 43.8          IMAGING:      Discharge Time of 30    Electronically signed by NIKOLAS Sharp CNP on 2022 at 5:22 PM

## 2022-09-26 NOTE — PROGRESS NOTES
I was called by microbiology and informed that 2/4 blood cultures were positive for Staph. Epi. Unclear at this point if this is a pathogen or a contaminant. Patient does have a known source of infection. At 8:23 am on  9/26/22 I attempted to call the patient to inform him of the results. Unfortunately, he did not answer, but I was able to leave a voicemail recommending he return to an Emergency Department. I will attempt to call the patient again later today.     Katharine Hernandez MD

## 2022-09-28 LAB
CULTURE: ABNORMAL
Lab: ABNORMAL
Lab: ABNORMAL
SPECIMEN: ABNORMAL
SPECIMEN: ABNORMAL

## 2023-02-04 NOTE — H&P
History and Physical      Name:  Stevo Baez /Age/Sex: 1980  (36 y.o. male)   MRN & CSN:  [id number] & [id number] Admission Date/Time: 2022  6:03 AM   Location:  [facility]20 PCP: No primary care provider on file. Hospital Day: 1     Attending physician: Dr. Jeanette Agrawal    Assessment and Plan:   Stevo Baez is a 36 y.o.  male  who presents with left flank pain with nausea and vomiting    Assessment and plan:     Left renal calculus: Acute UTI versus acute pyelonephritis versus infected calculus. CT abdomen pelvis-demonstrated small 3 mm complication favors a small phlebolith abutting the left ureter. Patient was treated with Rocephin, 2 doses of morphine 4 mg, Toradol 30 mg and fentanyl 50 mcg. UA demonstrated-white blood cells, red blood cells, moderate blood bacteria  -Inpatient  -Urology consulted  -Flomax daily first dose on admission  -Continue with meropenem and with 1 dose every 8 hours. In the setting of possible infected kidney stone.  -Urine and blood cultures and lactic acid pending  -MIVF  -Clear liquid diet advance to regular if patient can tolerate today he will be n.p.o. after midnight.  -Daily labs  -Toradol 15 mg every 6 hours as needed  -Dilaudid 0.5 mg as needed    Leukocytosis 19  -CBC daily  -Antibiotics as above  -MIVF    Nausea and vomiting 2/2 kidney stone. Patient endorses multiple episodes prior to arrival denies any vomiting since arrival to ER bed has some nausea. -Supportive care with analgesics, antiemetics  -MIVF    Hyperglycemia: 183 - Denies history of diabetes in the past  -Hemoglobin A1c pending  -MIVF  -BMP daily    Chronic Conditions: Denies any significant medical history. Obesity class I BMI 31.25: kg/m2 Life style modifications    Tobacco dependence: Smokes 2  ppd/day,  Patient was counseled on tobacco cessation.   -Nicotine patch daily    Disposition: Inpatient.  Patient was accepted for continue evaluation and treatment and improvement of clinical symptoms. Discussed assessment and treatment plan with the admitting and supervising physician who agrees with the plan. Diet Clear liquid, NPO after MN   DVT Prophylaxis [] Lovenox, []  Heparin, [x] SCDs, [] Warfarin  [] NOAC     GI Prophylaxis [] PPI,  [] H2 Blocker,  [] Carafate,  [] Diet/Tube Feeds   Code Status Full     History of Present Illness:     Chief Complaint: Renal calculus, left  Judythe Hamman is a 36 y.o.  male, with no known past significant medical history. Does endorse 2 pack-a-day smoker who presented to the emergency room with complaint of sudden onset of left flank pain nausea and vomiting. The present condition started last evening patient complains of left side pain that radiates to lower abdomen, with episodes of nausea and vomiting. Denies any fevers but does endorse cold chills. Also states noted blood in his urine this morning. Does endorse some difficulty urinating. Denies any diarrhea constipation. Denies any known history of renal stones in the past.  Patient denies any known significant family medical history. On Examination,the patient is able to state history and onset of symptoms. he still complains on pain in the left lower quadrant and nausea denies any vomiting. States morphine has helped the pain some but does not last long he complains of left-sided sharp pains. He denies any radiation of pain. At the ED, vital signs;T 97.9,HR 51, RR 19, /98 mm/hg,SPO2 98% RA Significant laboratories were the following: Glucose 183, WBC 19.7.  UA positive for WBC, RBC, moderate blood. Occasional bacteria  The patient was brought to the ED and was subsequently admitted for  further evaluation. and management          Review of Systems   Constitutional:  Positive for chills. Negative for fever. HENT:  Negative for congestion. Respiratory:  Negative for cough and shortness of breath.     Cardiovascular:  Negative for chest pain and palpitations. Gastrointestinal:  Positive for abdominal pain, nausea and vomiting. Left-sided back pain radiating to left lower quadrant. Genitourinary:  Positive for difficulty urinating, flank pain and hematuria. Negative for penile discharge, penile pain and scrotal swelling. Musculoskeletal:  Negative for arthralgias and myalgias. Skin: Negative. Neurological: Negative. Hematological: Negative. Psychiatric/Behavioral: Negative. Objective:   No intake or output data in the 24 hours ending 09/25/22 1310   Vitals:   Vitals:    09/25/22 1136   BP: (!) 153/74   Pulse: 70   Resp: 18   Temp:    SpO2: 96%     Physical Exam:   GEN- Awake male, lying in bed, appears to be stated age. EYES- Pupils are equally round. HENT- Mucous membranes are moist.   NECK- Supple, no apparent thyromegaly or masses. RESP-Clear to auscultation, no wheezes, rales or rhonchi. Symmetric chest movement while on room air. CARDIO/VASC-  Regular rate and rhythm,  Peripheral pulses equal bilaterally and palpable. GI- Abdomen is soft, minimal left lower quadrant tenderness, + left CVA tenderness  MSK- No gross joint deformities. EXTREMITIES- pulses intact, no swelling, no calf tenderness  SKIN- Normal coloration, warm, dry. NEURO-Cranial nerves appear grossly intact, normal speech, no lateralizing weakness. PSYCH-Awake, alert, oriented x 4. Past Medical History:      Past Medical History:   Diagnosis Date    MRSA (methicillin resistant staph aureus) culture positive      PSHX: Denies any past surgical history    Allergies:    Allergies   Allergen Reactions    Codeine        FAM HX: Patient denies any known significant family medical history  Soc HX:   Social History     Socioeconomic History    Marital status:      Spouse name: None    Number of children: None    Years of education: None    Highest education level: None   Tobacco Use    Smoking status: Every Day     Packs/day: 1.00     Types: Cigarettes    Smokeless tobacco: Never   Vaping Use    Vaping Use: Never used   Substance and Sexual Activity    Alcohol use: No    Drug use: Yes     Types: Marijuana [place] [place])     Comment: daily    Sexual activity: Yes     Partners: Female       Social and family history reviewed with patient/family. Medications:     Home Medication   Prior to Admission medications    Not on File     Medications:    ondansetron  4 mg Oral Once    tamsulosin  0.4 mg Oral Daily    nicotine  1 patch TransDERmal Daily    meropenem  1,000 mg IntraVENous Once    Followed by    meropenem  1,000 mg IntraVENous Q8H      Infusions:    sodium chloride 100 mL/hr at 09/25/22 1203     PRN Meds: ondansetron, 4 mg, Q30 Min PRN        Recent Labs     09/25/22  0636   WBC 19.7*   HGB 14.4   HCT 43.8         Recent Labs     09/25/22  0636      K 4.4      CO2 19*   BUN 16   CREATININE 0.9     Recent Labs     09/25/22  0636   AST 16   ALT 17   BILITOT 0.2   ALKPHOS 62     No results for input(s): INR in the last 72 hours. No results for input(s): CKTOTAL, CKMB, CKMBINDEX, TROPONINT in the last 72 hours. Imaging reviewed  CT ABDOMEN PELVIS WO CONTRAST Additional Contrast? None    Result Date: 9/25/2022  EXAMINATION: STONE PROTOCOL CT OF THE ABDOMEN AND PELVIS 9/25/2022 6:41 am TECHNIQUE: CT of the abdomen and pelvis was performed without the administration of intravenous contrast. Multiplanar reformatted images are provided for review. Automated exposure control, iterative reconstruction, and/or weight based adjustment of the mA/kV was utilized to reduce the radiation dose to as low as reasonably achievable. COMPARISON: None.  HISTORY: ORDERING SYSTEM PROVIDED HISTORY: LLQ pain TECHNOLOGIST PROVIDED HISTORY: Reason for exam:->LLQ pain Additional Contrast?->None Decision Support Exception - unselect if not a suspected or confirmed emergency medical condition->Emergency Medical Condition (MA) Reason for Exam: LLQ pain FINDINGS: yes